# Patient Record
Sex: MALE | Race: WHITE | NOT HISPANIC OR LATINO | URBAN - METROPOLITAN AREA
[De-identification: names, ages, dates, MRNs, and addresses within clinical notes are randomized per-mention and may not be internally consistent; named-entity substitution may affect disease eponyms.]

---
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
## 2022-01-01 NOTE — H&P PST PEDIATRIC - COMMENTS
16 year old male with significant medical history for deviated nasal septum, hypertrophy of turbinates scheduled for septoplasty, turbinoplasty on 4/19/2017 with Dr. Obrien. Mother 48 y/o healthy  Father 52 y/o healthy  brother 15 y/o healthy Autistic    No significant family history of bleeding disorders or problems with anesthesia. Statement Selected vaccines UTD, no vaccines in the past 2 weeks.  Flu vaccine Fall 2016 Mother 48 y/o healthy  Father 52 y/o healthy  brother 15 y/o Autistic    No significant family history of bleeding disorders or problems with anesthesia.

## 2025-01-23 NOTE — ASU DISCHARGE PLAN (ADULT/PEDIATRIC). - NURSING INSTRUCTIONS
- - -
narcotics may cause constipation - take stool softener, increase fiber and fluids.  Take narcotics with food to prevent nausea.

## 2025-06-24 NOTE — ASU DISCHARGE PLAN (ADULT/PEDIATRIC). - MEDICATION SUMMARY - MEDICATIONS TO TAKE
Readings sent to provider.   I will START or STAY ON the medications listed below when I get home from the hospital:    acetaminophen-codeine 300 mg-30 mg oral tablet  -- 1 tab(s) by mouth every 6 hours MDD:4 tablets  -- Caution federal law prohibits the transfer of this drug to any person other  than the person for whom it was prescribed.  May cause drowsiness.  Alcohol may intensify this effect.  Use care when operating dangerous machinery.  This product contains acetaminophen.  Do not use  with any other product containing acetaminophen to prevent possible liver damage.  Using more of this medication than prescribed may cause serious breathing problems.    -- Indication: For pain control, at [facility]    Zithromax Z-Smith 250 mg oral tablet  -- 1 tab(s) by mouth once a day MDD:2 tablets day 1, then 1 tablet days 2-5  -- Do not take dairy products, antacids, or iron preparations within one hour of this medication.  Finish all this medication unless otherwise directed by prescriber.    -- Indication: For prophyalxis, at [facility]